# Patient Record
Sex: MALE | Race: WHITE
[De-identification: names, ages, dates, MRNs, and addresses within clinical notes are randomized per-mention and may not be internally consistent; named-entity substitution may affect disease eponyms.]

---

## 2018-09-17 ENCOUNTER — HOSPITAL ENCOUNTER (EMERGENCY)
Dept: HOSPITAL 36 - ER | Age: 28
Discharge: HOME | End: 2018-09-17
Payer: MEDICAID

## 2018-09-17 DIAGNOSIS — K52.9: Primary | ICD-10-CM

## 2018-09-17 DIAGNOSIS — R53.1: ICD-10-CM

## 2018-09-17 LAB
ALBUMIN SERPL-MCNC: 5 GM/DL (ref 4.2–5.5)
ALBUMIN/GLOB SERPL: 2.1 {RATIO} (ref 1–1.8)
ALP SERPL-CCNC: 53 U/L (ref 34–104)
ALT SERPL-CCNC: 53 U/L (ref 7–52)
ANION GAP SERPL CALC-SCNC: 15.5 MMOL/L (ref 7–16)
APPEARANCE UR: CLEAR
AST SERPL-CCNC: 23 U/L (ref 13–39)
BACTERIA #/AREA URNS HPF: (no result) /HPF
BASOPHILS NFR BLD: 0 % (ref 0–3)
BILIRUB SERPL-MCNC: 1.7 MG/DL (ref 0.3–1)
BILIRUB UR-MCNC: (no result) MG/DL
BUN SERPL-MCNC: 18 MG/DL (ref 7–25)
CALCIUM SERPL-MCNC: 9.7 MG/DL (ref 8.6–10.3)
CHLORIDE SERPL-SCNC: 104 MEQ/L (ref 98–107)
CO2 SERPL-SCNC: 20.2 MEQ/L (ref 21–31)
COLOR UR: (no result)
CREAT SERPL-MCNC: 1.1 MG/DL (ref 0.7–1.3)
EOSINOPHIL NFR BLD: 0 % (ref 0–5)
EPI CELLS URNS QL MICRO: (no result) /LPF
ERYTHROCYTE [DISTWIDTH] IN BLOOD BY AUTOMATED COUNT: 11.2 % (ref 11.5–20)
GLOBULIN SER-MCNC: 2.4 GM/DL
GLUCOSE SERPL-MCNC: 186 MG/DL (ref 70–105)
GLUCOSE UR STRIP-MCNC: NEGATIVE MG/DL
HCT VFR BLD CALC: 54.9 % (ref 41–60)
HGB BLD-MCNC: 18.8 GM/DL (ref 12–16)
KETONES UR STRIP-MCNC: (no result) MG/DL
LEUKOCYTE ESTERASE UR-ACNC: NEGATIVE
LYMPHOCYTES # BLD MANUAL: 5 % (ref 20–50)
MCH RBC QN AUTO: 31.6 PG (ref 26–30)
MCHC RBC AUTO-ENTMCNC: 34.3 PG (ref 28–36)
MCV RBC AUTO: 92.1 FL (ref 80–99)
MICRO URNS: YES
MONOCYTES # BLD MANUAL: 5 % (ref 2–10)
NEUTROPHILS NFR BLD AUTO: 78 % (ref 40–80)
NEUTS BAND NFR BLD: 12 % (ref 0–10)
NITRITE UR QL STRIP: NEGATIVE
PH UR STRIP: 6 [PH] (ref 4.6–8)
PLATELET # BLD EST: ADEQUATE 10*3/UL
PLATELET # BLD: 188 TH/CMM (ref 150–400)
PMV BLD AUTO: 8.9 FL
POTASSIUM SERPL-SCNC: 3.7 MEQ/L (ref 3.5–5.1)
PROT UR STRIP-MCNC: 30 MG/DL
RBC # BLD AUTO: 5.96 MIL/CMM (ref 4.3–5.7)
RBC # UR STRIP: (no result) /UL
RBC #/AREA URNS HPF: (no result) /HPF (ref 0–5)
SODIUM SERPL-SCNC: 136 MEQ/L (ref 136–145)
SP GR UR STRIP: >= 1.03 (ref 1–1.03)
URINALYSIS COMPLETE PNL UR: (no result)
UROBILINOGEN UR STRIP-ACNC: 0.2 E.U./DL (ref 0.2–1)
WBC # BLD AUTO: 13.4 TH/CMM (ref 4.8–10.8)
WBC #/AREA URNS HPF: (no result) /HPF (ref 0–5)

## 2018-09-17 PROCEDURE — Z7502: HCPCS

## 2018-09-17 PROCEDURE — 96374 THER/PROPH/DIAG INJ IV PUSH: CPT

## 2018-09-17 PROCEDURE — 36415 COLL VENOUS BLD VENIPUNCTURE: CPT

## 2018-09-17 PROCEDURE — 87040 BLOOD CULTURE FOR BACTERIA: CPT

## 2018-09-17 PROCEDURE — 99284 EMERGENCY DEPT VISIT MOD MDM: CPT

## 2018-09-17 PROCEDURE — 83036 HEMOGLOBIN GLYCOSYLATED A1C: CPT

## 2018-09-17 PROCEDURE — 89055 LEUKOCYTE ASSESSMENT FECAL: CPT

## 2018-09-17 PROCEDURE — 87046 STOOL CULTR AEROBIC BACT EA: CPT

## 2018-09-17 PROCEDURE — 81001 URINALYSIS AUTO W/SCOPE: CPT

## 2018-09-17 PROCEDURE — 85007 BL SMEAR W/DIFF WBC COUNT: CPT

## 2018-09-17 PROCEDURE — 80053 COMPREHEN METABOLIC PANEL: CPT

## 2018-09-17 PROCEDURE — 85025 COMPLETE CBC W/AUTO DIFF WBC: CPT

## 2018-09-17 NOTE — ED PHYSICIAN CHART
ED Chief Complaint/HPI





- Patient Information


Date Seen:: 09/17/18


Time Seen:: 08:24


Chief Complaint:: abd pain diarhea


History of Present Illness:: 





27 yr old male who ate dominos salami pineapple at midnight and since then 

watery diarhea and vomiting non stop other members mom dad similar sxs


Allergies:: 


 Allergies











Allergy/AdvReac Type Severity Reaction Status Date / Time


 


No Known Allergies Allergy   Verified 09/17/18 08:07











Vitals:: 


 Vital Signs - 8 hr











  09/17/18 09/17/18





  08:07 08:09


 


Temp 98.2 F 98.2 F


 


 107


 


RR 24 16


 


/71 155/71


 


O2 Sat % 99 99














ED Review of Systems





- Review of Systems


General/Constitutional: Weakness


Skin: No skin lesions


Head: No headache


Eyes: No loss of vision


ENT: No earache


Neck: No neck pain


Cardio Vascular: No chest pain


Pulmonary: No SOB


GI: Nausea, Vomiting, Diarrhea


Musculoskeletal: No bone or joint pain


Endocrine: No polyuria


Psychiatric: No depression


Hematopoietic: No bruising


Allergic/Immuno: No urticaria


Neurological: No syncope





ED Past Medical History





- Past Medical History


Past Medical History: No significant medical hx





Family Medical History





- Family Member


  ** Grandmother


History Unknown: Yes


Ethnicity: Non-


Living Status: Still Living


Hx Family Cancer: No


Hx Family Coronary Artery Disease: No


Hx Family Congestive Heart Failure: No


Hx Family Hypertension: Yes


Hx Family Stroke: No


Hx Family Diabetes: Yes


Hx Family Seizures: No


Hx Family Dementia: No


Hx Family AIDS: No


Hx Family HIV: No


Hx Family COPD: No


Hx Family Hepatitis: No


Hx Family Psychiatric Problems: No


Hx Family Tuberculosis: No





ED Physical Exam





- Physical Examination


General/Constitutional: Awake, Well-developed, well-nourished, Alert


Head: Atraumatic


Eyes: Lids, conjuctiva normal, PERRL, EOMI


Skin: Nl inspection, No rash, No skin lesions, No ecchymosis, Well hydrated, No 

lymphadenopathy


ENMT: External ears, nose nl, Nasal exam nl, Lips, teeth, gums nl


Neck: Nontender, Full ROM w/o pain, No JVD, No nuchal rigidity, No bruit, No 

mass, No stridor


Respiratory: Nl effort/Exclusion, Clear to Auscultation, No Wheeze/Rhonchi/Rales


Cardio Vascular: RRR, No murmur, gallop, rubs, NL S1 S2


GI: No tenderness/rebounding/guarding, No organomegaly, No hernia, Normal BS's, 

Nondistended, No mass/bruits, No McBurney tenderness


: No CVA tenderness


Extremities: No tenderness or effusion, Full ROM, normal strength in all 

extremities, No edema, Normal digits & nails


Neuro/Psych: Alert/oriented, DTR's symmetric, Normal sensory exam, Normal motor 

strength, Judgement/insight normal, Mood normal, Normal gait, No focal deficits


Misc: Normal back, No paraspinal tenderness





ED Assessment





- Assessment


General Assessment: 





abd pain diarhea gastroenteritis





ED Septic Shock





- .


Is Septic Shock (SBP<90, OR Lactate>4 mmol\L) present?: No





- <6hrs of presentation:


Vital Signs: 


 Vital Signs - 8 hr











  09/17/18 09/17/18





  08:07 08:09


 


Temp 98.2 F 98.2 F


 


 107


 


RR 24 16


 


/71 155/71


 


O2 Sat % 99 99














ED Reassessment (Disposition)





- Reassessment


Reassessment Condition:: Improved





- Diagnosis


Diagnosis:: 





abd pain nv  bloody diahea invasive gastroenteritis   probable  salmonella 

shigella labs stool sample 





- Patient Disposition


Discharge/Transfer:: Acute Care w/in this hosp


Admitted to:: Med/Surg